# Patient Record
Sex: FEMALE | Race: ASIAN | URBAN - METROPOLITAN AREA
[De-identification: names, ages, dates, MRNs, and addresses within clinical notes are randomized per-mention and may not be internally consistent; named-entity substitution may affect disease eponyms.]

---

## 2021-10-26 ENCOUNTER — OFFICE VISIT (OUTPATIENT)
Dept: URBAN - METROPOLITAN AREA CLINIC 91 | Facility: CLINIC | Age: 76
End: 2021-10-26
Payer: MEDICARE

## 2021-10-26 DIAGNOSIS — H04.123 DRY EYE SYNDROME OF BILATERAL LACRIMAL GLANDS: ICD-10-CM

## 2021-10-26 DIAGNOSIS — H17.9 CORNEAL SCAR: Primary | ICD-10-CM

## 2021-10-26 PROCEDURE — 92025 CPTRIZED CORNEAL TOPOGRAPHY: CPT | Performed by: SPECIALIST

## 2021-10-26 PROCEDURE — 99214 OFFICE O/P EST MOD 30 MIN: CPT | Performed by: SPECIALIST

## 2021-10-26 ASSESSMENT — VISUAL ACUITY
OD: 20/30
OS: 20/40

## 2021-10-26 ASSESSMENT — INTRAOCULAR PRESSURE
OD: 17
OS: 18

## 2021-10-26 NOTE — IMPRESSION/PLAN
Impression: Corneal scar: H17.9. Plan: Discussed corneal scarring. Will continue to monitor at this time.

## 2022-10-25 ENCOUNTER — OFFICE VISIT (OUTPATIENT)
Dept: URBAN - METROPOLITAN AREA CLINIC 91 | Facility: CLINIC | Age: 77
End: 2022-10-25
Payer: MEDICARE

## 2022-10-25 DIAGNOSIS — H17.9 UNSPECIFIED CORNEAL SCAR AND OPACITY: Primary | ICD-10-CM

## 2022-10-25 PROCEDURE — 99213 OFFICE O/P EST LOW 20 MIN: CPT | Performed by: SPECIALIST

## 2022-10-25 PROCEDURE — 92025 CPTRIZED CORNEAL TOPOGRAPHY: CPT | Performed by: SPECIALIST

## 2022-10-25 ASSESSMENT — VISUAL ACUITY
OD: 20/30
OS: 20/40

## 2022-10-25 ASSESSMENT — INTRAOCULAR PRESSURE
OS: 19
OD: 17